# Patient Record
Sex: MALE | Race: WHITE | NOT HISPANIC OR LATINO | Employment: UNEMPLOYED | URBAN - METROPOLITAN AREA
[De-identification: names, ages, dates, MRNs, and addresses within clinical notes are randomized per-mention and may not be internally consistent; named-entity substitution may affect disease eponyms.]

---

## 2017-06-13 ENCOUNTER — OFFICE VISIT (OUTPATIENT)
Dept: URGENT CARE | Facility: CLINIC | Age: 22
End: 2017-06-13

## 2017-06-13 PROCEDURE — 99204 OFFICE O/P NEW MOD 45 MIN: CPT

## 2018-10-31 ENCOUNTER — HOSPITAL ENCOUNTER (EMERGENCY)
Facility: HOSPITAL | Age: 23
Discharge: HOME | End: 2018-10-31
Attending: EMERGENCY MEDICINE | Admitting: EMERGENCY MEDICINE
Payer: COMMERCIAL

## 2018-10-31 VITALS
HEIGHT: 67 IN | BODY MASS INDEX: 28.56 KG/M2 | HEART RATE: 88 BPM | DIASTOLIC BLOOD PRESSURE: 91 MMHG | SYSTOLIC BLOOD PRESSURE: 139 MMHG | TEMPERATURE: 97.6 F | OXYGEN SATURATION: 97 % | RESPIRATION RATE: 18 BRPM | WEIGHT: 182 LBS

## 2018-10-31 DIAGNOSIS — H61.21 IMPACTED CERUMEN OF RIGHT EAR: ICD-10-CM

## 2018-10-31 DIAGNOSIS — H66.92 LEFT OTITIS MEDIA: Primary | ICD-10-CM

## 2018-10-31 PROCEDURE — 99282 EMERGENCY DEPT VISIT SF MDM: CPT

## 2018-10-31 RX ORDER — AMOXICILLIN 250 MG/1
1000 CAPSULE ORAL ONCE
Status: COMPLETED | OUTPATIENT
Start: 2018-11-01 | End: 2018-10-31

## 2018-10-31 RX ORDER — AMOXICILLIN 500 MG/1
1000 CAPSULE ORAL EVERY 8 HOURS SCHEDULED
Qty: 30 CAPSULE | Refills: 0 | Status: SHIPPED | OUTPATIENT
Start: 2018-10-31 | End: 2018-11-05

## 2018-10-31 RX ADMIN — AMOXICILLIN 1000 MG: 250 CAPSULE ORAL at 23:55

## 2018-11-01 NOTE — ED PROVIDER NOTES
History  Chief Complaint   Patient presents with   Golden Brandt     pt presents to ER stating for the past hour he noticed an ear wax build up in his right ear that he cant get out, he gets frequent ear infections and thinks he has one in his left ear     24year old male presents for evaluation of bilateral earache which has been worsening today  Patient states that he has been using ofloxacin ear drops for the past 3 days with no improvement and believes that the medication is not working due to earwax build up  He attempted to remove the wax with a Qtip, but thinks he may have pushed it further in  He reports history of frequent ear infections with tympanostomies as a child as well as replacement of the left TM when he was 9years old  He denies nasal congestion, fevers, chills, cough, headaches, dizziness or lightheadedness  He does not currently follow with an ENT physician  Earache   Location:  Bilateral  Behind ear:  No abnormality  Quality:  Aching  Severity:  Mild  Onset quality:  Gradual  Duration:  3 days  Timing:  Constant  Progression:  Worsening  Chronicity:  Recurrent  Relieved by:  Nothing  Worsened by:  Nothing  Ineffective treatments: ofloxacin ear drops  Associated symptoms: no abdominal pain, no congestion, no cough, no diarrhea, no fever, no headaches, no neck pain, no rhinorrhea, no sore throat and no vomiting    Risk factors: prior ear surgery        None       History reviewed  No pertinent past medical history  Past Surgical History:   Procedure Laterality Date    EAR SURGERY         History reviewed  No pertinent family history  I have reviewed and agree with the history as documented  Social History   Substance Use Topics    Smoking status: Never Smoker    Smokeless tobacco: Never Used    Alcohol use No        Review of Systems   Constitutional: Negative for appetite change, diaphoresis, fatigue and fever  HENT: Positive for ear pain   Negative for congestion, rhinorrhea and sore throat  Respiratory: Negative for cough, chest tightness and shortness of breath  Cardiovascular: Negative for chest pain, palpitations and leg swelling  Gastrointestinal: Negative for abdominal pain, constipation, diarrhea, nausea and vomiting  Genitourinary: Negative for difficulty urinating, dysuria, frequency and hematuria  Musculoskeletal: Negative for myalgias, neck pain and neck stiffness  Skin: Negative for pallor  Neurological: Negative for syncope, weakness and headaches  All other systems reviewed and are negative  Physical Exam  Physical Exam   Constitutional: He appears well-developed and well-nourished  Non-toxic appearance  No distress  HENT:   Head: Normocephalic and atraumatic  Left Ear: Tympanic membrane is injected  Right TM obstructed by impacted cerumen   Eyes: Pupils are equal, round, and reactive to light  EOM are normal    Neck: Normal range of motion  No tracheal deviation present  No thyromegaly present  Cardiovascular: Normal rate, regular rhythm, normal heart sounds and intact distal pulses  Pulmonary/Chest: Effort normal and breath sounds normal    Abdominal: Soft  Bowel sounds are normal  He exhibits no distension  There is no tenderness  Lymphadenopathy:     He has no cervical adenopathy  Neurological: He is alert  Skin: Skin is warm and dry  He is not diaphoretic  Nursing note and vitals reviewed        Vital Signs  ED Triage Vitals   Temperature Pulse Respirations Blood Pressure SpO2   10/31/18 2324 10/31/18 2326 10/31/18 2326 10/31/18 2326 10/31/18 2326   97 6 °F (36 4 °C) 89 18 139/91 97 %      Temp Source Heart Rate Source Patient Position - Orthostatic VS BP Location FiO2 (%)   10/31/18 2324 -- 10/31/18 2326 10/31/18 2326 --   Temporal  Lying Right arm       Pain Score       10/31/18 2324       2           Vitals:    10/31/18 2326 10/31/18 2330   BP: 139/91 139/91   Pulse: 89 88   Patient Position - Orthostatic VS: Lying        Visual Acuity      ED Medications  Medications   amoxicillin (AMOXIL) capsule 1,000 mg (1,000 mg Oral Given 10/31/18 2552)       Diagnostic Studies  Results Reviewed     None                 No orders to display              Procedures  Procedures       Phone Contacts  ED Phone Contact    ED Course                               MDM  Number of Diagnoses or Management Options  Impacted cerumen of right ear: new and requires workup  Left otitis media: new and requires workup  Diagnosis management comments: 24year old male presents for evaluation of bilateral earache not responsive to ofloxacin ear drops  Impacted cerumen deep in the right ear canal obstructing view of TM  Left TM injected  Likely otitis media  Amoxicillin prescribed  Recommended patient to obtain Debrox ear drops over the counter for cerumen  ENT follow up given history of recurrent otitis and prior TM replacement  Discussed return precautions with patient  Patient Progress  Patient progress: stable    CritCare Time    Disposition  Final diagnoses:   Impacted cerumen of right ear   Left otitis media     Time reflects when diagnosis was documented in both MDM as applicable and the Disposition within this note     Time User Action Codes Description Comment    10/31/2018 11:52 PM Remington Mayfield Add [H61 21] Impacted cerumen of right ear     10/31/2018 11:53 PM Remington Mayfield Add [H66 92] Left otitis media     10/31/2018 11:53 PM Remington Mayfield Modify [H61 21] Impacted cerumen of right ear     10/31/2018 11:53 PM Remington Mayfield Modify [C38 04] Left otitis media       ED Disposition     ED Disposition Condition Comment    Discharge  Jas Loomis discharge to home/self care      Condition at discharge: Stable        Follow-up Information     Follow up With Specialties Details Why [facility]  Schedule an appointment as soon as possible for a visit in 1 week for re-evaluation if symptoms are not improving [address] [facility]  [address] [facility] [place] Emergency Department Emergency Medicine Go to If symptoms worsen [address] [facility]  [address] [facility] [address], [place] [address], [place], [place], [address]          There are no discharge medications for this patient  No discharge procedures on file      ED Provider  Electronically Signed by           Rowan Pérez MD  11/02/18 9195

## 2018-11-01 NOTE — DISCHARGE INSTRUCTIONS
Cerumen Impaction   WHAT YOU NEED TO KNOW:   Cerumen impaction is the blockage of the outer ear canal by tightly packed cerumen (earwax)  It is generally treated with procedures such as flushing or suctioning the ear canal or the use of instruments to remove the impaction  DISCHARGE INSTRUCTIONS:   Medicines:  · Ear drops: These are used to soften the wax in your ear  Wax softening ear drops may be bought without a prescription  Ask your healthcare provider how often you should use this medicine  Read the instructions carefully before you use the ear drops  Do the following when you put in ear drops:     ¨ Warm the drops by holding the bottle in your hands for a few minutes  Cold ear drops may make you dizzy  ¨ Lie down with the affected ear toward the ceiling  You may also stand with your head tilted to one side  ¨ Pull your ear lobe up and back, and place the correct number of drops into the ear  ¨ Keep your ear facing up for 5 to 10 minutes so the drops coat the outer ear canal      ¨ Gently clean the outer part of the ear with a cotton swab  Do not  place the cotton swab or anything inside your ear canal  This increases the risk of damaging your eardrum  · Take your medicine as directed  Contact your healthcare provider if you think your medicine is not helping or if you have side effects  Tell him of her if you are allergic to any medicine  Keep a list of the medicines, vitamins, and herbs you take  Include the amounts, and when and why you take them  Bring the list or the pill bottles to follow-up visits  Carry your medicine list with you in case of an emergency  Follow up with your healthcare provider as directed:  Write down your questions so you remember to ask them during your visits  Contact your healthcare provider if:   · You have a fever  · You have trouble hearing or ringing in your ear  · You have questions about your condition or care    Return to the emergency department if:   · You feel dizzy  · You have discharge or blood coming out of your ear  · Your ear pain does not go away or gets worse  © 2017 [employer] Information is for End User's use only and may not be sold, redistributed or otherwise used for commercial purposes  All illustrations and images included in CareNotes® are the copyrighted property of A D [employer] M , [employer]  or [employer]  The above information is an  only  It is not intended as medical advice for individual conditions or treatments  Talk to your doctor, nurse or pharmacist before following any medical regimen to see if it is safe and effective for you  Otitis Media   WHAT YOU NEED TO KNOW:   Otitis media is an ear infection  DISCHARGE INSTRUCTIONS:   Medicines:  · Ibuprofen or acetaminophen  helps decrease your pain and fever  They are available without a doctor's order  Ask your healthcare provider which medicine is right for you  Ask how much to take and how often to take it  These medicines can cause stomach bleeding if not taken correctly  Ibuprofen can cause kidney damage  Do not take ibuprofen if you have kidney disease, an ulcer, or allergies to aspirin  Acetaminophen can cause liver damage  Do not drink alcohol if you take acetaminophen  · Ear drops  help treat your ear pain  · Antibiotics  help treat a bacterial infection that caused your ear infection  · Take your medicine as directed  Contact your healthcare provider if you think your medicine is not helping or if you have side effects  Tell him or her if you are allergic to any medicine  Keep a list of the medicines, vitamins, and herbs you take  Include the amounts, and when and why you take them  Bring the list or the pill bottles to follow-up visits  Carry your medicine list with you in case of an emergency  Heat or ice:   · Heat  may be used to decrease your pain  Place a warm, moist washcloth on your ear   Apply for 15 to 20 minutes, 3 to 4 times a day    · Ice  helps decrease swelling and pain  Use an ice pack or put crushed ice in a plastic bag  Cover the ice pack with a towel and place it on your ear for 15 to 20 minutes, 3 to 4 times a day for 2 days  Prevent otitis media:   · Wash your hands often  Use soap and water  Wash your hands after you use the bathroom, change a child's diapers, or sneeze  Wash your hands before you prepare or eat food  · Stay away from people who are ill  Some germs are easily and quickly spread through contact  Return to work or school: You may return to work or school when your fever is gone  Follow up with your healthcare provider as directed:  Write down your questions so you remember to ask them during your visits  Contact your healthcare provider if:   · Your ear pain gets worse or does not go away, even after treatment  · The outside of your ear is red or swollen  · You have vomiting or diarrhea  · You have fluid coming from your ear  · You have questions or concerns about your condition or care  Return to the emergency department if:   · You have a seizure  · You have a fever and a stiff neck  © 2017 [employer]  Information is for End User's use only and may not be sold, redistributed or otherwise used for commercial purposes  All illustrations and images included in CareNotes® are the copyrighted property of A [employer] M [employer]  or [employer]  The above information is an  only  It is not intended as medical advice for individual conditions or treatments  Talk to your doctor, nurse or pharmacist before following any medical regimen to see if it is safe and effective for you

## 2019-12-22 ENCOUNTER — HOSPITAL ENCOUNTER (EMERGENCY)
Facility: HOSPITAL | Age: 24
Discharge: HOME | End: 2019-12-22
Attending: EMERGENCY MEDICINE | Admitting: EMERGENCY MEDICINE

## 2019-12-22 VITALS
TEMPERATURE: 98.9 F | RESPIRATION RATE: 20 BRPM | DIASTOLIC BLOOD PRESSURE: 78 MMHG | HEIGHT: 67 IN | HEART RATE: 103 BPM | BODY MASS INDEX: 29.03 KG/M2 | OXYGEN SATURATION: 100 % | SYSTOLIC BLOOD PRESSURE: 149 MMHG | WEIGHT: 185 LBS

## 2019-12-22 DIAGNOSIS — K08.89 PAIN, DENTAL: Primary | ICD-10-CM

## 2019-12-22 PROCEDURE — 99282 EMERGENCY DEPT VISIT SF MDM: CPT

## 2019-12-22 PROCEDURE — 64450 NJX AA&/STRD OTHER PN/BRANCH: CPT | Performed by: EMERGENCY MEDICINE

## 2019-12-22 PROCEDURE — 99282 EMERGENCY DEPT VISIT SF MDM: CPT | Performed by: EMERGENCY MEDICINE

## 2019-12-22 RX ORDER — BUPIVACAINE HYDROCHLORIDE 5 MG/ML
5 INJECTION, SOLUTION EPIDURAL; INTRACAUDAL ONCE
Status: COMPLETED | OUTPATIENT
Start: 2019-12-22 | End: 2019-12-22

## 2019-12-22 RX ADMIN — BUPIVACAINE HYDROCHLORIDE 5 ML: 5 INJECTION, SOLUTION EPIDURAL; INTRACAUDAL; PERINEURAL at 16:33

## 2019-12-22 NOTE — ED PROVIDER NOTES
History  Chief Complaint   Patient presents with    Dental Pain     pt with right lower tooth that is broken and painful, broke 1 month ago  Pt lives at [facility] and is here for antibiotics, was on amoxicillin  History provided by:  Patient   used: No    Dental Pain   Associated symptoms: no fever and no headaches        Patient is a 48-year-old male presenting to emergency department due to broken tooth on the right lower side  Broke 1 month ago  Has not been able to see dentist   Lanny Gardner course of antibiotics  Unsure if he needs more antibiotics  No trouble swallowing  No trismus  Tongue secretions  Former heroin user  No current drug use  MDM dental clinic follow-up, NSAIDs, Tylenol, offered dental block which patient accepted    Prior to Admission Medications   Prescriptions Last Dose Informant Patient Reported? Taking? Naltrexone (VIVITROL IM) Past Month at Unknown time  Yes Yes   Sig: Inject into a muscle      Facility-Administered Medications: None       History reviewed  No pertinent past medical history  Past Surgical History:   Procedure Laterality Date    EAR SURGERY         History reviewed  No pertinent family history  I have reviewed and agree with the history as documented  Social History     Tobacco Use    Smoking status: Never Smoker    Smokeless tobacco: Never Used   Substance Use Topics    Alcohol use: No    Drug use: No        Review of Systems   Constitutional: Negative for chills, diaphoresis and fever  HENT: Positive for dental problem  Respiratory: Negative for cough, shortness of breath, wheezing and stridor  Cardiovascular: Negative for chest pain, palpitations and leg swelling  Gastrointestinal: Negative for abdominal pain, blood in stool, diarrhea, nausea and vomiting  Genitourinary: Negative for dysuria, frequency and urgency  Musculoskeletal: Negative for neck stiffness  Skin: Negative for pallor and rash  Neurological: Negative for dizziness, syncope, weakness, light-headedness and headaches  All other systems reviewed and are negative  Physical Exam  Physical Exam   Constitutional: He is oriented to person, place, and time  He appears well-developed and well-nourished  No distress  HENT:   Head: Normocephalic and atraumatic  No trismus  Tolerating secretions  Right lower 3rd tooth broken  No erythema  No swelling  No abscess appreciated on palpation of the gum line   Eyes: EOM are normal    Neck: Neck supple  Cardiovascular: Normal rate and regular rhythm  Pulmonary/Chest: Effort normal and breath sounds normal    Musculoskeletal: Normal range of motion  He exhibits no edema, tenderness or deformity  Neurological: He is alert and oriented to person, place, and time  Skin: Skin is warm  Capillary refill takes less than 2 seconds  No rash noted  He is not diaphoretic  No erythema  No pallor  Vital Signs  ED Triage Vitals [12/22/19 1606]   Temperature Pulse Respirations Blood Pressure SpO2   98 9 °F (37 2 °C) 103 20 149/78 100 %      Temp Source Heart Rate Source Patient Position - Orthostatic VS BP Location FiO2 (%)   Temporal Monitor Sitting Right arm --      Pain Score       7           Vitals:    12/22/19 1606 12/22/19 1615   BP: 149/78 149/78   Pulse: 103    Patient Position - Orthostatic VS: Sitting          Visual Acuity      ED Medications  Medications   bupivacaine (PF) (MARCAINE) 0 5 % injection 5 mL (5 mL Infiltration Given 12/22/19 1633)       Diagnostic Studies  Results Reviewed     None                 No orders to display              Procedures  Nerve block  Date/Time: 12/22/2019 4:30 PM  Performed by: Letta Merlin, MD  Authorized by: Letta Merlin, MD     Patient location:  ED  Consent:     Consent obtained:  Verbal    Risks discussed:   Allergic reaction, bleeding, infection, intravenous injection, nerve damage, pain, swelling and unsuccessful block    Alternatives discussed:  No treatment  Universal protocol:     Procedure explained and questions answered to patient or proxy's satisfaction: yes      Patient identity confirmed:  Verbally with patient  Indications:     Indications:  Pain relief  Location:     Body area:  Head    Head nerve blocked: Inferior alveolar nerve  Laterality:  Right  Skin anesthesia (see MAR for exact dosages):     Skin anesthesia method:  None  Procedure details (see MAR for exact dosages): Block needle gauge:  27 G    Anesthetic injected:  Bupivacaine 0 5% w/o epi    Steroid injected:  None    Additive injected:  None    Injection procedure:  Anatomic landmarks identified, incremental injection, negative aspiration for blood, introduced needle and anatomic landmarks palpated  Post-procedure details:     Dressing:  None    Outcome:  Pain improved    Patient tolerance of procedure: Tolerated well, no immediate complications             ED Course                               MDM      Disposition  Final diagnoses:   Pain, dental     Time reflects when diagnosis was documented in both MDM as applicable and the Disposition within this note     Time User Action Codes Description Comment    12/22/2019  4:38 PM Nohemi Mcbride Add [K08 89] Pain, dental       ED Disposition     ED Disposition Condition Date/Time Comment    Discharge Stable Sun Dec 22, 2019  4:38 PM Nesha Aviles discharge to home/self care              Follow-up Information     Follow up With Specialties Details Why Contact Info Additional Information     [address] Emergency Department Emergency Medicine  As needed, If symptoms worsen [address] [place],[address] [address] [phone]  ED, [address] [place], [address]    [address]  Call in 1 day Please call and follow up [address] [facility] [address]  [address]  [phone]           Discharge Medication List as of 12/22/2019  4:38 PM      CONTINUE these medications which have NOT CHANGED    Details   Naltrexone (VIVITROL IM) Inject into a muscle, Historical Med           No discharge procedures on file      ED Provider  Electronically Signed by           Tiffany Potts MD  12/22/19 5945

## 2020-03-02 ENCOUNTER — HOSPITAL ENCOUNTER (EMERGENCY)
Facility: HOSPITAL | Age: 25
Discharge: HOME | End: 2020-03-02
Attending: EMERGENCY MEDICINE
Payer: COMMERCIAL

## 2020-03-02 VITALS
OXYGEN SATURATION: 94 % | TEMPERATURE: 98.7 F | RESPIRATION RATE: 18 BRPM | BODY MASS INDEX: 29.82 KG/M2 | SYSTOLIC BLOOD PRESSURE: 152 MMHG | WEIGHT: 190 LBS | HEART RATE: 73 BPM | DIASTOLIC BLOOD PRESSURE: 90 MMHG | HEIGHT: 67 IN

## 2020-03-02 DIAGNOSIS — J04.0 LARYNGITIS: Primary | ICD-10-CM

## 2020-03-02 PROCEDURE — 99282 EMERGENCY DEPT VISIT SF MDM: CPT

## 2020-03-02 PROCEDURE — 99284 EMERGENCY DEPT VISIT MOD MDM: CPT | Performed by: PHYSICIAN ASSISTANT

## 2020-03-02 RX ADMIN — DEXAMETHASONE SODIUM PHOSPHATE 10 MG: 10 INJECTION, SOLUTION INTRAMUSCULAR; INTRAVENOUS at 22:01

## 2020-03-03 NOTE — ED NOTES
D/c reviewed with pt  Pt verbalized understanding and has no further questions at this time  Pt ambulatory off unit with steady gait       Michelle Starr RN  03/02/20 3460

## 2020-03-03 NOTE — ED PROVIDER NOTES
History  Chief Complaint   Patient presents with    Sore Throat     Patient reports a sore throat that began three days ago  Patient denies NVD  22 yo male pt here with sore throat  Started 4 days ago  States he had fever and chills when it first started  Now with hoarse voice  Aside from voice change he states his symptoms have all gotten better  No fever, chills, vomiting, dysphagia, odynophagia at this time  History provided by:  Patient   used: No    Sore Throat   Location:  Generalized  Quality:  Aching  Severity:  Moderate  Onset quality:  Gradual  Duration:  1 day  Timing:  Constant  Progression:  Unchanged  Chronicity:  New  Relieved by:  Nothing  Worsened by:  Nothing  Associated symptoms: voice change    Associated symptoms: no abdominal pain, no adenopathy, no chest pain, no chills, no cough, no drooling, no ear discharge, no ear pain, no epistaxis, no eye discharge, no fever, no headaches, no neck stiffness, no night sweats, no plugged ear sensation, no postnasal drip, no rash, no rhinorrhea, no shortness of breath, no sinus congestion, no stridor and no trouble swallowing        Prior to Admission Medications   Prescriptions Last Dose Informant Patient Reported? Taking? Naltrexone (VIVITROL IM)   Yes No   Sig: Inject into a muscle      Facility-Administered Medications: None       History reviewed  No pertinent past medical history  Past Surgical History:   Procedure Laterality Date    EAR SURGERY         History reviewed  No pertinent family history  I have reviewed and agree with the history as documented      E-Cigarette/Vaping     E-Cigarette/Vaping Substances     Social History     Tobacco Use    Smoking status: Never Smoker    Smokeless tobacco: Never Used   Substance Use Topics    Alcohol use: No    Drug use: No       Review of Systems   Constitutional: Negative for activity change, appetite change, chills, diaphoresis, fatigue, fever, night sweats and unexpected weight change  HENT: Positive for sore throat and voice change  Negative for congestion, drooling, ear discharge, ear pain, nosebleeds, postnasal drip, rhinorrhea, sinus pressure and trouble swallowing  Eyes: Negative for photophobia, discharge and visual disturbance  Respiratory: Negative for apnea, cough, choking, chest tightness, shortness of breath, wheezing and stridor  Cardiovascular: Negative for chest pain, palpitations and leg swelling  Gastrointestinal: Negative for abdominal distention, abdominal pain, blood in stool, constipation, diarrhea, nausea and vomiting  Genitourinary: Negative for decreased urine volume, difficulty urinating, dysuria, enuresis, flank pain, frequency, hematuria and urgency  Musculoskeletal: Negative for arthralgias, myalgias, neck pain and neck stiffness  Skin: Negative for color change, pallor, rash and wound  Allergic/Immunologic: Negative  Neurological: Negative for dizziness, tremors, syncope, weakness, light-headedness, numbness and headaches  Hematological: Negative  Negative for adenopathy  Psychiatric/Behavioral: Negative  All other systems reviewed and are negative  Physical Exam  Physical Exam   Constitutional: He is oriented to person, place, and time  He appears well-developed and well-nourished  Non-toxic appearance  He does not have a sickly appearance  He does not appear ill  No distress  HENT:   Head: Normocephalic and atraumatic  Hoarse voice  Otherwise normal HEENT exam     Eyes: Pupils are equal, round, and reactive to light  EOM and lids are normal    Neck: Normal range of motion  Neck supple  Cardiovascular: Normal rate, regular rhythm, S1 normal, S2 normal, normal heart sounds, intact distal pulses and normal pulses  Exam reveals no gallop, no distant heart sounds, no friction rub and no decreased pulses  No murmur heard  Pulses:       Radial pulses are 2+ on the right side, and 2+ on the left side  Pulmonary/Chest: Effort normal and breath sounds normal  No accessory muscle usage  No apnea, no tachypnea and no bradypnea  No respiratory distress  He has no decreased breath sounds  He has no wheezes  He has no rhonchi  He has no rales  Abdominal: Soft  Normal appearance  He exhibits no distension  There is no tenderness  There is no rigidity, no rebound and no guarding  Musculoskeletal: Normal range of motion  He exhibits no edema, tenderness or deformity  Neurological: He is alert and oriented to person, place, and time  No cranial nerve deficit  GCS eye subscore is 4  GCS verbal subscore is 5  GCS motor subscore is 6  Skin: Skin is warm, dry and intact  No rash noted  He is not diaphoretic  No erythema  No pallor  Psychiatric: His speech is normal    Nursing note and vitals reviewed        Vital Signs  ED Triage Vitals [20]   Temperature Pulse Respirations Blood Pressure SpO2   98 7 °F (37 1 °C) 73 18 152/90 94 %      Temp Source Heart Rate Source Patient Position - Orthostatic VS BP Location FiO2 (%)   Oral Monitor Sitting Left arm --      Pain Score       --           Vitals:    20   BP: 152/90   Pulse: 73   Patient Position - Orthostatic VS: Sitting         Visual Acuity      ED Medications  Medications   dexamethasone 10 mg/mL oral liquid 10 mg 1 mL (10 mg Oral Given 3/2/20 2201)       Diagnostic Studies  Results Reviewed     None                 No orders to display              Procedures  Procedures         ED Course                               MDM  Number of Diagnoses or Management Options  Laryngitis: new and requires workup  Diagnosis management comments: Differential diagnosis includes but is not limited to strep, mono, viral syndrome, laryngitis    Risk of Complications, Morbidity, and/or Mortality  Presenting problems: low  Management options: low  General comments: Plan/medical decision m[age]-year-old with sore throat clinically consistent with viral syndrome, laryngitis  Will give Decadron for symptomatic relief  Follow up as needed  No evidence to suggest peritonsillar retropharyngeal abscess at this time  Discussed return parameters  Patient understands and agrees with plan    Patient Progress  Patient progress: stable        Disposition  Final diagnoses:   Laryngitis     Time reflects when diagnosis was documented in both MDM as applicable and the Disposition within this note     Time User Action Codes Description Comment    3/2/2020  9:55 PM Ana Ceron Add [J04 0] Laryngitis       ED Disposition     ED Disposition Condition Date/Time Comment    Discharge Stable Mon Mar 2, 2020  9:55 PM [place] discharge to home/self care  Follow-up Information     Follow up With Specialties Details Why Contact Info    Luis Fernando Rodriguez MD Family Medicine Call  As needed [id number] [facility] [id number] [phone]            Discharge Medication List as of 3/2/2020  9:56 PM      CONTINUE these medications which have NOT CHANGED    Details   Naltrexone (VIVITROL IM) Inject into a muscle, Historical Med           No discharge procedures on file      PDMP Review     None          ED Provider  Electronically Signed by           Alvaro Harrington PA-C  03/02/20 1402